# Patient Record
Sex: MALE | Race: AMERICAN INDIAN OR ALASKA NATIVE
[De-identification: names, ages, dates, MRNs, and addresses within clinical notes are randomized per-mention and may not be internally consistent; named-entity substitution may affect disease eponyms.]

---

## 2018-04-30 ENCOUNTER — HOSPITAL ENCOUNTER (EMERGENCY)
Dept: HOSPITAL 42 - ED | Age: 38
Discharge: HOME | End: 2018-04-30
Payer: COMMERCIAL

## 2018-04-30 VITALS — RESPIRATION RATE: 18 BRPM | HEART RATE: 79 BPM | DIASTOLIC BLOOD PRESSURE: 78 MMHG | SYSTOLIC BLOOD PRESSURE: 145 MMHG

## 2018-04-30 VITALS — OXYGEN SATURATION: 99 % | TEMPERATURE: 98.7 F
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
## 2018-04-30 DIAGNOSIS — Z23: ICD-10-CM

## 2018-04-30 DIAGNOSIS — S61.012A: Primary | ICD-10-CM

## 2018-04-30 NOTE — ED PDOC
Arrival/HPI





- General


Chief Complaint: Finger,Hand,&Wrist


Time Seen by Provider: 04/30/18 15:40


Historian: Patient





- History of Present Illness


Narrative History of Present Illness (Text): 





04/30/18 15:53


36yo male with no PMhx who present with complaint of left thumb laceration. 

states he accidentally cut himself with the metal part of a broom this 

afternoon. He notes that he is not up to date with his TD booster. He denies 

any other complaint.





Past Medical History





- Provider Review


Nursing Documentation Reviewed: Yes





- Psychiatric


Hx Substance Use: No





Family/Social History





- Physician Review


Nursing Documentation Reviewed: Yes


Family/Social History: Unknown Family HX


Smoking Status: Never Smoked


Hx Alcohol Use: No


Hx Substance Use: No





Allergies/Home Meds


Allergies/Adverse Reactions: 


Allergies





No Known Allergies Allergy (Verified 04/30/18 15:35)


 











Review of Systems





- Physician Review


All systems were reviewed & negative as marked: Yes





- Review of Systems


Constitutional: Normal


Eyes: Normal


ENT: Normal


Respiratory: Normal


Cardiovascular: Normal


Gastrointestinal: Normal


Genitourinary Male: Normal


Musculoskeletal: Normal


Skin: Laceration (Left thumb)


Neurological: Normal


Endocrine: Normal


Hemo/Lymphatic: Normal


Psychiatric: Normal





Physical Exam


Vital Signs Reviewed: Yes


Vital Signs











  Temp Pulse Resp BP Pulse Ox


 


 04/30/18 16:16   79  18  145/78  99


 


 04/30/18 15:35  98.7 F  88  17  148/87  99











Temperature: Afebrile


Blood Pressure: Normal


Pulse: Regular


Respiratory Rate: Normal


Appearance: Positive for: Well-Appearing, Non-Toxic, Comfortable


Pain Distress: None


Mental Status: Positive for: Alert and Oriented X 3





- Systems Exam


Head: Present: Atraumatic, Normocephalic


Pupils: Present: PERRL


Extroacular Muscles: Present: EOMI


Conjunctiva: Present: Normal


Mouth: Present: Moist Mucous Membranes


Neck: Present: Normal Range of Motion


Respiratory/Chest: Present: Clear to Auscultation, Good Air Exchange.  No: 

Respiratory Distress, Accessory Muscle Use


Cardiovascular: Present: Regular Rate and Rhythm, Normal S1, S2.  No: Murmurs


Abdomen: No: Tenderness, Distention, Peritoneal Signs


Back: Present: Normal Inspection


Upper Extremity: Present: Normal Inspection.  No: Cyanosis, Edema


Lower Extremity: Present: Normal Inspection.  No: Edema


Neurological: Present: GCS=15, CN II-XII Intact, Speech Normal


Skin: Present: Warm, Dry, Normal Color, Other (1.7cm curvilinear laceration to 

left thumb tuft).  No: Rashes


Psychiatric: Present: Alert, Oriented x 3, Normal Insight, Normal Concentration





Medical Decision Making





- Medication Orders


Current Medication Orders: 











Discontinued Medications





Tetanus/Reduced Diphtheria/Acell Pertussis (Boostrix Vaccine Inj)  0.5 ml IM 

.ONCE ONE


   Stop: 04/30/18 16:00


   Last Admin: 04/30/18 16:15  Dose: 0.5 ml





MAR Immunization Data


 Document     04/30/18 16:15  [place]  (Rec: 04/30/18 16:15  [place]  [id number])


     Immunization Data


      Vaccine Information Sheet Given            Yes


Immunization Registry


 Document     04/30/18 16:15  [place]  (Rec: 04/30/18 16:15  [place]  [id number])


      Immunization Registry Consent Date         04/30/18














Procedure: Wound Repair





- Consent Obtained


Consent obtained: Verbal





- Performed by


Performed by: Mid-level Provider





- Indications


Indication(s):: Laceration





- Location


Finger:: Left, Thumb


Shape:: Curvilinear


Dimensions width cm: 1.7





- Anesthetic Technique


Local/Regional Anesthetic:: Lidocaine 1% (5)





- Debris


Debris:: None





- Irrigated


Irrigated with ml of normal saline: 80





- Complexity


Complexity:: Intermediate (2 layer)





- Muscle repiar layer closed with


Muscle repair layer closed with:: # (6), Size (5), Type (nylon), Technique (

interrupted), Wound well approximated, Abx ointment applied, Dressing applied, 

Tetanus ordered





- Patient tolerated procedure


Patient Tolerated Procedure:: Well





Disposition/Present on Arrival





- Present on Arrival


Any Indicators Present on Arrival: No


History of DVT/PE: No


History of Uncontrolled Diabetes: No


Urinary Catheter: No


History of Decub. Ulcer: No


History Surgical Site Infection Following: None





- Disposition


Have Diagnosis and Disposition been Completed?: Yes


Diagnosis: 


 Thumb laceration





Disposition: HOME/ ROUTINE


Disposition Time: 16:30


Patient Plan: Discharge


Patient Problems: 


 Current Active Problems











Problem Status Onset


 


Thumb laceration Acute  











Condition: STABLE


Discharge Instructions (ExitCare):  Laceration Repair


Additional Instructions: 


Follow up with your Doctor


Keep wound clean and dry


Return to ED for any redness, discharge, or worsening symptoms


Prescriptions: 


Cephalexin [Keflex] 500 mg PO TID #15 capsule


Diphth,Pertuss(Acell),Tet Vac [Boostrix Tdap] 0.5 ml IM ONCE #1 vial


Referrals: 


PCP,NO [Primary Care Provider] - Follow up with primary


Forms:  [employer] (English)